# Patient Record
Sex: FEMALE | Race: AMERICAN INDIAN OR ALASKA NATIVE
[De-identification: names, ages, dates, MRNs, and addresses within clinical notes are randomized per-mention and may not be internally consistent; named-entity substitution may affect disease eponyms.]

---

## 2018-06-04 ENCOUNTER — HOSPITAL ENCOUNTER (OUTPATIENT)
Dept: HOSPITAL 5 - SPVWC | Age: 83
Discharge: HOME | End: 2018-06-04
Attending: INTERNAL MEDICINE
Payer: MEDICARE

## 2018-06-04 DIAGNOSIS — Z78.0: ICD-10-CM

## 2018-06-04 DIAGNOSIS — M85.88: Primary | ICD-10-CM

## 2018-06-04 DIAGNOSIS — F17.210: ICD-10-CM

## 2018-06-04 PROCEDURE — 77080 DXA BONE DENSITY AXIAL: CPT

## 2018-06-05 NOTE — MAMMOGRAPHY REPORT
BONE DEXA:06/04/18 13:51:00



CLINICAL: Postmenopausal.



COMPARISON: 09/20/16



TECHNIQUE: Two site bone DEXA performed on an Hologic scanner.





FINDINGS:

The average BMD of the lumbar spine L1-L4 is 0.794g/cm squared with a 

T-score of -2.3 and a Z-score of +0.5. This compares to 0.792g/cm 

squared on the last exam and represents a +0.3% change from the 

previous baseline.





The average BMD of the left hip is 0.814g/cm squared with a T-score of 

-1.0 and a Z-score of +1.2. This compares to  0.870g/cm squared on the 

last exam and represents a -6.4% change from the previous baseline.  

The left femoral neck BMD is 0.705g/cm squared with a T score of -1.3 

and a Z score of +1.1.





IMPRESSION:

1. WHO classification: Osteopenia with increased fracture risk  based 

on spine and left femoral neck measurements.



2. A slight improvement in spine BMD and a moderate

decline in left hip BMD compared to the previous exam.





RECOMMENDATION: Clinical correlation and routine screening.





DEFINITIONS:

  BMD     = Bone Mineral Density

  T-score = BMD related to mean peak bone mass of young adult

            (mean expressed in Standard Deviation)

  Z-score = Age matched BMD expressed in SD



World Health Organization (WHO) Diagnostic Criteria

  Normal             T-score > -1 SD

  Osteopenia      T-score between -1 and -2.4 SD

  Osteoporosis    T-score -2.5 SD or below



NOTE:

      BMD is not the only risk factor for fracture; also consider 

factors such as the patient's age, risk of falling, previous 

osteoporotic fracture, family history of osteoporotic fractures, 

current smoker, and low body weight.



Z-scores are not calculated if >80 years of age.